# Patient Record
Sex: FEMALE | Race: WHITE | NOT HISPANIC OR LATINO
[De-identification: names, ages, dates, MRNs, and addresses within clinical notes are randomized per-mention and may not be internally consistent; named-entity substitution may affect disease eponyms.]

---

## 2024-04-02 ENCOUNTER — APPOINTMENT (OUTPATIENT)
Dept: VASCULAR SURGERY | Facility: CLINIC | Age: 39
End: 2024-04-02
Payer: MEDICAID

## 2024-04-02 ENCOUNTER — APPOINTMENT (OUTPATIENT)
Dept: VASCULAR SURGERY | Facility: CLINIC | Age: 39
End: 2024-04-02

## 2024-04-02 VITALS
SYSTOLIC BLOOD PRESSURE: 138 MMHG | BODY MASS INDEX: 32.2 KG/M2 | HEIGHT: 62 IN | HEART RATE: 83 BPM | DIASTOLIC BLOOD PRESSURE: 87 MMHG | WEIGHT: 175 LBS

## 2024-04-02 DIAGNOSIS — N94.89 OTHER SPECIFIED CONDITIONS ASSOCIATED WITH FEMALE GENITAL ORGANS AND MENSTRUAL CYCLE: ICD-10-CM

## 2024-04-02 DIAGNOSIS — I86.8: ICD-10-CM

## 2024-04-02 PROBLEM — Z00.00 ENCOUNTER FOR PREVENTIVE HEALTH EXAMINATION: Status: ACTIVE | Noted: 2024-04-02

## 2024-04-02 PROCEDURE — 93971 EXTREMITY STUDY: CPT

## 2024-04-02 PROCEDURE — 99204 OFFICE O/P NEW MOD 45 MIN: CPT

## 2024-04-03 PROBLEM — N94.89 PELVIC CONGESTION SYNDROME: Status: ACTIVE | Noted: 2024-04-03

## 2024-04-04 ENCOUNTER — APPOINTMENT (OUTPATIENT)
Dept: VASCULAR SURGERY | Facility: CLINIC | Age: 39
End: 2024-04-04
Payer: MEDICAID

## 2024-04-04 DIAGNOSIS — I83.899 VARICOSE VEINS OF UNSPECIFIED LOWER EXTREMITY WITH OTHER COMPLICATIONS: ICD-10-CM

## 2024-04-04 DIAGNOSIS — I83.891 VARICOSE VEINS OF RIGHT LOWER EXTREMITY WITH OTHER COMPLICATIONS: ICD-10-CM

## 2024-04-04 PROCEDURE — 99214 OFFICE O/P EST MOD 30 MIN: CPT

## 2024-04-04 NOTE — PROCEDURE
[FreeTextEntry1] : RLE Venous duplex ordered to r/o insuff and eval vv, shows: no DVT ro SVT. No GSV or SSV reflux. Larger gross vv.   Procedure performed: Sclerotherapy  Indications: bleeding varicose vein, right medial ankle Procedure: Consent was obtained. Patient was placed in comfortable position, legs were prepped with alcohol, 1% sotradecal (1mL) mixed with normal saline (2mL) was injected into the veins and pressure dressing with cotton balls and tape was applied. Patient tolerated the procedure well. Legs were wrapped with Unna boot, kerlix and ACE bandage.

## 2024-04-04 NOTE — ASSESSMENT
[Arterial/Venous Disease] : arterial/venous disease [Foot care/Footwear] : foot care/footwear [FreeTextEntry1] : 37 y/o F w/ RLE large symptomatic varicose veins (particularly during pregnancies), spider veins, leg swelling, vaginal varices with recent bleeding vv R ankle s/p cauterization at local  and now, s/p sclero. Also, suspected pelvic congestion syndrome, pending CTV.  On exam, RLE large bulging varicose veins from proximal medial groin down the anterior and lateral aspect of the thigh and on lateral and medial aspect of the calf. R medial ankle, area of bleeding varicose vein with small, pin-point skin opening, no bleeding, no signs of infection, no large bulging or superficial varices. Scattered spider veins on both legs, concentrated on right ankle/foot. Venous duplex reviewed. Findings discussed with pt and recommend to limit activity and elevate the legs as much as possible. She should wear ACE bandages or compression stockings daily until after the holidays given she will be on her feet a lot and area still sensitive. No rubbing of the site, no baths. Pad dry after showers. In the event of another bleed, instructions provided for pt on how to care for the site and to see us asap.  Will schedule CTV at [place] once authorized.

## 2024-04-04 NOTE — PHYSICAL EXAM
[Respiratory Effort] : normal respiratory effort [2+] : left 2+ [Ankle Swelling (On Exam)] : present [Ankle Swelling Bilaterally] : bilaterally  [Ankle Swelling On The Right] : mild [Varicose Veins Of Lower Extremities] : present [Varicose Veins Of The Right Leg] : of the right leg [Ankle Swelling On The Left] : moderate [Alert] : alert [Oriented to Person] : oriented to person [Oriented to Place] : oriented to place [Oriented to Time] : oriented to time [Calm] : calm [] : not present [de-identified] : WN/WD [FreeTextEntry1] : RLE large bulging varicose veins from proximal medial groin down the anterior and lateral aspect of the thigh and on lateral and medial aspect of the calf. R medial ankle with partially lifting scab and very superficial, thin, bulging vein under. Scattered spider veins on both legs, concentrated on right ankle/foot. [de-identified] : FROM [de-identified] : See cardiovascular section

## 2024-04-04 NOTE — PHYSICAL EXAM
[Respiratory Effort] : normal respiratory effort [2+] : left 2+ [Ankle Swelling (On Exam)] : present [Ankle Swelling Bilaterally] : bilaterally  [Ankle Swelling On The Right] : mild [Varicose Veins Of Lower Extremities] : present [Varicose Veins Of The Right Leg] : of the right leg [Ankle Swelling On The Left] : moderate [Alert] : alert [Oriented to Person] : oriented to person [Oriented to Place] : oriented to place [Oriented to Time] : oriented to time [Calm] : calm [] : not present [de-identified] : WN/WD [de-identified] : FROM [FreeTextEntry1] : RLE large bulging varicose veins from proximal medial groin down the anterior and lateral aspect of the thigh and on lateral and medial aspect of the calf. R medial ankle, area of bleeding varicose vein with small, pin-point skin opening, no bleeding, no signs of infection, no large bulging or superficial varices. Scattered spider veins on both legs, concentrated on right ankle/foot. [de-identified] : See cardiovascular section

## 2024-04-04 NOTE — HISTORY OF PRESENT ILLNESS
[FreeTextEntry1] : 37 y/o F referred by [facility]. She has a PMHx of cholecystectomy, long standing hx of varicose veins and recent bleeding varicose vein, presenting for evaluation. She explains the varicose veins are mainly on the right leg and become very large and hurt a lot during pregnancies. With each pregnancy, the veins have gotten larger and more numerous, especially after her 3rd pregnancy. She has a total of 6 children and plans to have more. She also has vaginal varices which become bothersome prior to her monthly menstrual cycles. She started birth control recently to ensure she can get the veins treated prior to another pregnancy. Off of pregnancies, the veins are large but do not really bother her, burn or itch. She has tried compression stockings 20-30mmHg knee, thigh and pantyhose however, she feels they are all suffocating and cannot tolerate them. She elevates her legs every night and notes the legs are more swollen and heavy on the days she stands longer. She is very active and is on her feet a lot, taking care of the kids, running errands and cooking. during pregnancies, she explains the swelling is severe and very limiting. About 2.5 weeks ago, she was taking a bath and suddenly noted blood everywhere. She did not notice at first where it was coming from, but it was from a varicose vein. She quickly elevated the leg and applied pressure. She managed to stop the bleeding at the time. Unfortunately, it bled again this past weekend. She was seen at a local  and the area was cauterized. She now has a scab over the area and seems to be lifting. She is concerned it will happen again and wonders if any vein procedures are recommended.  Denies any fever, chills, vein procedures, open wounds, leg trauma, palpable cords.  SHx: -Never smoker

## 2024-04-04 NOTE — ADDENDUM
[FreeTextEntry1] : Ms. Karyn Forte is a 38F w/ large RLE symptomatic varicose veins (especially during pregnancies), who presents with bleeding/oozing from large R ankle varicose vein. Sclerotherapy injected, manual compressure held and then unna boot appplied. Will follow up in 48hrs. Of note, venous duplex showed no DVT ro SVT. No GSV or SSV reflux. Larger gross vv.  I, Dr. Tommy Gabriel, personally performed the evaluation and management (E/M) services for this new patient.  That E/M includes conducting the initial examination, assessing all conditions, and establishing the plan of care.  Today, my ACP, Mi Sherwood NP, was here to observe my evaluation and management services for this patient to be followed going forward.

## 2024-04-04 NOTE — ASSESSMENT
[Arterial/Venous Disease] : arterial/venous disease [Foot care/Footwear] : foot care/footwear [FreeTextEntry1] : 39 y/o F w/ RLE large symptomatic varicose veins (particularly during pregnancies), spider veins, leg swelling, vaginal varices with recent bleeding vv R ankle s/p cauterization at local  and now, s/p sclero. Also, suspected pelvic congestion syndrome.  On exam, LEs well perfused. RLE large bulging varicose veins from proximal medial groin down the anterior and lateral aspect of the thigh and on lateral and medial aspect of the calf. R medial ankle with partially lifting scab and very superficial, thin, bulging vein under. Scattered spider veins on both legs, concentrated on right ankle/foot. Venous duplex showed no DVT ro SVT. No GSV or SSV reflux. Larger gross vv.  Given difficulty to control bleed during office visit, unna allan therapy started once bleeding controlled after sclero performed and pressure applied on site.  Findings discussed with pt and recommend to see us in 2 days for unna boot removal. Limit activity and elevate the legs as much as possible. In the mean time, will also obtain insurance auth for CTV to r/o any evidence of pelvic congestion syndrome prior to any other vein procedures. She is not a candidate for GSV or SSV RFA. No evidence of reflux. She would need stab phlebectomy of the RLE veins and vaginal varices.

## 2024-04-04 NOTE — ADDENDUM
[FreeTextEntry1] : Ms. Karyn Forte presents for follow up after bleeding varicose vein by ankle s/p sclerotherapy injection, manual compression and then unna boot on 4/2/24. Unna boot removed. No more oozing from the previously bleeding varicose vein.   I, Dr. Tommy Gabriel, personally performed the evaluation and management (E/M) services for this established patient who presents today with (a) chronic problem(s) of (an) existing condition(s).  That E/M includes conducting the examination, assessing all conditions, and establishing a plan of care. Today, my ACP, Mi Sherwood NP, was here to observe my evaluation and management services for this condition(s) to be followed going forward.

## 2024-04-04 NOTE — HISTORY OF PRESENT ILLNESS
[FreeTextEntry1] : 39 y/o F referred by [facility]. She has a PMHx of cholecystectomy, long standing hx of varicose veins and recent bleeding varicose vein, presenting for follow-up visit. She was seen a couple of days ago with RLE large symptomatic varicose veins (particularly during pregnancies), spider veins, leg swelling, vaginal varices with recent bleeding vv from R ankle s/p cauterization at local  and now, s/p sclero and unna boot in our office. Also, suspected pelvic congestion syndrome. No evidence of GSV or SSV relfux.  Today, she reports keeping the unna boot in place and no evidence of bleeding. She is ready for us to remove the unna boot as it is very uncomfortable for her. She is concerned this will happen again and wants to know what to watch out for and how to care for the site. The holidays are coming up and she will be on her feet for many days cleaning and cooking.  CTV sitll pending and will arrange at [facility].    SHx: -Never smoker

## 2024-05-28 ENCOUNTER — RESULT REVIEW (OUTPATIENT)
Age: 39
End: 2024-05-28